# Patient Record
Sex: FEMALE | Race: WHITE | NOT HISPANIC OR LATINO | Employment: OTHER | URBAN - NONMETROPOLITAN AREA
[De-identification: names, ages, dates, MRNs, and addresses within clinical notes are randomized per-mention and may not be internally consistent; named-entity substitution may affect disease eponyms.]

---

## 2023-10-06 ENCOUNTER — TELEPHONE (OUTPATIENT)
Dept: CARDIOLOGY | Facility: CLINIC | Age: 58
End: 2023-10-06
Payer: MEDICARE

## 2023-10-06 NOTE — TELEPHONE ENCOUNTER
Patient is scheduled for surgery 10/26/23. Stress from [facility] in chart. Patient would like note faxed to [phone] when reviewed by Dr. Del Angel when he returns.

## 2023-11-28 ENCOUNTER — APPOINTMENT (OUTPATIENT)
Dept: CARDIOLOGY | Facility: CLINIC | Age: 58
End: 2023-11-28
Payer: MEDICARE

## 2024-01-31 PROBLEM — G35 MULTIPLE SCLEROSIS (MULTI): Status: ACTIVE | Noted: 2024-01-31

## 2024-01-31 PROBLEM — Z91.81 AT RISK FOR FALLS: Status: ACTIVE | Noted: 2024-01-31

## 2024-01-31 PROBLEM — E03.9 HYPOTHYROIDISM: Status: ACTIVE | Noted: 2024-01-31

## 2024-01-31 PROBLEM — R60.9 EDEMA: Status: ACTIVE | Noted: 2024-01-31

## 2024-01-31 PROBLEM — I10 ESSENTIAL HYPERTENSION: Status: ACTIVE | Noted: 2024-01-31

## 2024-01-31 PROBLEM — G47.30 SLEEP APNEA: Status: ACTIVE | Noted: 2024-01-31

## 2024-01-31 RX ORDER — LEVOTHYROXINE SODIUM 125 UG/1
125 TABLET ORAL
COMMUNITY

## 2024-01-31 RX ORDER — CYCLOSPORINE 0.5 MG/ML
1 EMULSION OPHTHALMIC EVERY 12 HOURS
COMMUNITY

## 2024-01-31 RX ORDER — METOLAZONE 5 MG/1
5 TABLET ORAL DAILY
COMMUNITY
Start: 2023-09-26

## 2024-01-31 RX ORDER — ALBUTEROL SULFATE 0.83 MG/ML
SOLUTION RESPIRATORY (INHALATION)
COMMUNITY

## 2024-01-31 RX ORDER — LOSARTAN POTASSIUM 50 MG/1
75 TABLET ORAL DAILY
COMMUNITY

## 2024-01-31 RX ORDER — ZONISAMIDE 50 MG/1
1 CAPSULE ORAL 2 TIMES DAILY
COMMUNITY

## 2024-01-31 RX ORDER — ACETAZOLAMIDE 125 MG/1
125 TABLET ORAL NIGHTLY
COMMUNITY
Start: 2023-08-31

## 2024-01-31 RX ORDER — DULOXETIN HYDROCHLORIDE 20 MG/1
20 CAPSULE, DELAYED RELEASE ORAL 2 TIMES DAILY
COMMUNITY

## 2024-01-31 RX ORDER — METHOCARBAMOL 500 MG/1
500 TABLET, FILM COATED ORAL 4 TIMES DAILY
COMMUNITY
Start: 2023-09-26

## 2024-01-31 RX ORDER — FUROSEMIDE 40 MG/1
40 TABLET ORAL DAILY
COMMUNITY

## 2024-01-31 RX ORDER — ONDANSETRON 4 MG/1
4 TABLET, FILM COATED ORAL AS NEEDED
COMMUNITY

## 2024-01-31 RX ORDER — CLONAZEPAM 1 MG/1
2 TABLET ORAL DAILY
COMMUNITY

## 2024-01-31 RX ORDER — LANOLIN ALCOHOL/MO/W.PET/CERES
1000 CREAM (GRAM) TOPICAL DAILY
COMMUNITY
Start: 2023-08-31

## 2024-01-31 RX ORDER — POTASSIUM CHLORIDE 750 MG/1
10 TABLET, FILM COATED, EXTENDED RELEASE ORAL AS NEEDED
COMMUNITY

## 2024-01-31 RX ORDER — BISMUTH SUBSALICYLATE 262 MG
1 TABLET,CHEWABLE ORAL DAILY
COMMUNITY

## 2024-01-31 RX ORDER — PREGABALIN 75 MG/1
75 CAPSULE ORAL 2 TIMES DAILY
COMMUNITY
Start: 2023-09-26

## 2024-01-31 RX ORDER — ALBUTEROL SULFATE 90 UG/1
2 AEROSOL, METERED RESPIRATORY (INHALATION) EVERY 6 HOURS
COMMUNITY
Start: 2023-06-01

## 2024-04-03 ENCOUNTER — APPOINTMENT (OUTPATIENT)
Dept: CARDIOLOGY | Facility: CLINIC | Age: 59
End: 2024-04-03
Payer: MEDICARE

## 2024-04-18 ENCOUNTER — OFFICE VISIT (OUTPATIENT)
Dept: FAMILY MEDICINE CLINIC | Age: 59
End: 2024-04-18
Payer: MEDICARE

## 2024-04-18 VITALS
OXYGEN SATURATION: 97 % | BODY MASS INDEX: 50.02 KG/M2 | DIASTOLIC BLOOD PRESSURE: 84 MMHG | SYSTOLIC BLOOD PRESSURE: 118 MMHG | WEIGHT: 293 LBS | HEART RATE: 63 BPM | HEIGHT: 64 IN

## 2024-04-18 DIAGNOSIS — E03.9 HYPOTHYROIDISM, UNSPECIFIED TYPE: Primary | ICD-10-CM

## 2024-04-18 DIAGNOSIS — R39.9 UTI SYMPTOMS: ICD-10-CM

## 2024-04-18 DIAGNOSIS — M48.061 SPINAL STENOSIS OF LUMBAR REGION WITHOUT NEUROGENIC CLAUDICATION: ICD-10-CM

## 2024-04-18 DIAGNOSIS — M48.02 CERVICAL STENOSIS OF SPINAL CANAL: ICD-10-CM

## 2024-04-18 PROBLEM — M54.16 LUMBAR RADICULOPATHY: Status: ACTIVE | Noted: 2024-04-18

## 2024-04-18 PROBLEM — M54.12 CERVICAL RADICULOPATHY: Status: ACTIVE | Noted: 2023-07-05

## 2024-04-18 PROBLEM — M16.9 HIP OSTEOARTHRITIS: Status: ACTIVE | Noted: 2023-08-28

## 2024-04-18 PROBLEM — I51.7 CARDIOMEGALY: Status: ACTIVE | Noted: 2023-08-28

## 2024-04-18 PROBLEM — R60.0 BILATERAL LOWER EXTREMITY EDEMA: Status: ACTIVE | Noted: 2023-08-31

## 2024-04-18 LAB
BILIRUBIN, POC: NORMAL
BLOOD URINE, POC: NORMAL
CLARITY, POC: CLEAR
COLOR, POC: YELLOW
GLUCOSE URINE, POC: NORMAL
KETONES, POC: NORMAL
LEUKOCYTE EST, POC: NORMAL
NITRITE, POC: NORMAL
PH, POC: 5.5
PROTEIN, POC: NORMAL
SPECIFIC GRAVITY, POC: 1.02
UROBILINOGEN, POC: NORMAL

## 2024-04-18 PROCEDURE — 1036F TOBACCO NON-USER: CPT | Performed by: NURSE PRACTITIONER

## 2024-04-18 PROCEDURE — G8419 CALC BMI OUT NRM PARAM NOF/U: HCPCS | Performed by: NURSE PRACTITIONER

## 2024-04-18 PROCEDURE — 3017F COLORECTAL CA SCREEN DOC REV: CPT | Performed by: NURSE PRACTITIONER

## 2024-04-18 PROCEDURE — G8427 DOCREV CUR MEDS BY ELIG CLIN: HCPCS | Performed by: NURSE PRACTITIONER

## 2024-04-18 PROCEDURE — 99214 OFFICE O/P EST MOD 30 MIN: CPT | Performed by: NURSE PRACTITIONER

## 2024-04-18 PROCEDURE — 81003 URINALYSIS AUTO W/O SCOPE: CPT | Performed by: NURSE PRACTITIONER

## 2024-04-18 RX ORDER — ALBUTEROL SULFATE 2.5 MG/3ML
2.5 SOLUTION RESPIRATORY (INHALATION) EVERY 4 HOURS PRN
COMMUNITY
Start: 2023-01-25

## 2024-04-18 RX ORDER — PREGABALIN 75 MG/1
75 CAPSULE ORAL 2 TIMES DAILY
COMMUNITY
Start: 2023-09-26

## 2024-04-18 RX ORDER — BROMPHENIRAMINE MALEATE, PSEUDOEPHEDRINE HYDROCHLORIDE, AND DEXTROMETHORPHAN HYDROBROMIDE 2; 30; 10 MG/5ML; MG/5ML; MG/5ML
SYRUP ORAL
COMMUNITY
Start: 2024-03-19

## 2024-04-18 RX ORDER — FUROSEMIDE 40 MG/1
40 TABLET ORAL PRN
COMMUNITY

## 2024-04-18 RX ORDER — IBUPROFEN 200 MG
200 TABLET ORAL EVERY 8 HOURS PRN
COMMUNITY
Start: 2017-09-20

## 2024-04-18 RX ORDER — CYCLOBENZAPRINE HCL 10 MG
10 TABLET ORAL PRN
COMMUNITY

## 2024-04-18 RX ORDER — ZONISAMIDE 100 MG/1
100 CAPSULE ORAL 2 TIMES DAILY
COMMUNITY
Start: 2023-06-25

## 2024-04-18 RX ORDER — LOSARTAN POTASSIUM 50 MG/1
75 TABLET ORAL DAILY
COMMUNITY

## 2024-04-18 RX ORDER — LEVOTHYROXINE SODIUM 0.12 MG/1
125 TABLET ORAL DAILY
COMMUNITY

## 2024-04-18 RX ORDER — ACETAZOLAMIDE 125 MG/1
125 TABLET ORAL NIGHTLY
COMMUNITY
Start: 2023-08-31

## 2024-04-18 RX ORDER — POTASSIUM CHLORIDE 750 MG/1
10 CAPSULE, EXTENDED RELEASE ORAL 2 TIMES DAILY PRN
COMMUNITY
Start: 2023-07-21

## 2024-04-18 RX ORDER — ONDANSETRON 4 MG/1
4 TABLET, ORALLY DISINTEGRATING ORAL EVERY 8 HOURS PRN
COMMUNITY
Start: 2017-09-20

## 2024-04-18 RX ORDER — CYCLOSPORINE 0.5 MG/ML
1 EMULSION OPHTHALMIC EVERY 12 HOURS
COMMUNITY

## 2024-04-18 RX ORDER — ACETAMINOPHEN 500 MG
500 TABLET ORAL
COMMUNITY
Start: 2022-08-25

## 2024-04-18 RX ORDER — METHOCARBAMOL 500 MG/1
500 TABLET, FILM COATED ORAL 4 TIMES DAILY
COMMUNITY

## 2024-04-18 RX ORDER — CLONAZEPAM 1 MG/1
1 TABLET ORAL 3 TIMES DAILY
COMMUNITY

## 2024-04-18 RX ORDER — LANOLIN ALCOHOL/MO/W.PET/CERES
CREAM (GRAM) TOPICAL
COMMUNITY

## 2024-04-18 RX ORDER — HEPARIN SODIUM (PORCINE) LOCK FLUSH IV SOLN 100 UNIT/ML 100 UNIT/ML
500 SOLUTION INTRAVENOUS
COMMUNITY

## 2024-04-18 RX ORDER — ALBUTEROL SULFATE 90 UG/1
AEROSOL, METERED RESPIRATORY (INHALATION)
COMMUNITY

## 2024-04-18 RX ORDER — DULOXETIN HYDROCHLORIDE 30 MG/1
30 CAPSULE, DELAYED RELEASE ORAL 2 TIMES DAILY
COMMUNITY

## 2024-04-18 SDOH — ECONOMIC STABILITY: FOOD INSECURITY: WITHIN THE PAST 12 MONTHS, THE FOOD YOU BOUGHT JUST DIDN'T LAST AND YOU DIDN'T HAVE MONEY TO GET MORE.: NEVER TRUE

## 2024-04-18 SDOH — ECONOMIC STABILITY: FOOD INSECURITY: WITHIN THE PAST 12 MONTHS, YOU WORRIED THAT YOUR FOOD WOULD RUN OUT BEFORE YOU GOT MONEY TO BUY MORE.: NEVER TRUE

## 2024-04-18 SDOH — ECONOMIC STABILITY: HOUSING INSECURITY
IN THE LAST 12 MONTHS, WAS THERE A TIME WHEN YOU DID NOT HAVE A STEADY PLACE TO SLEEP OR SLEPT IN A SHELTER (INCLUDING NOW)?: NO

## 2024-04-18 SDOH — ECONOMIC STABILITY: INCOME INSECURITY: HOW HARD IS IT FOR YOU TO PAY FOR THE VERY BASICS LIKE FOOD, HOUSING, MEDICAL CARE, AND HEATING?: NOT HARD AT ALL

## 2024-04-18 ASSESSMENT — PATIENT HEALTH QUESTIONNAIRE - PHQ9
1. LITTLE INTEREST OR PLEASURE IN DOING THINGS: NOT AT ALL
SUM OF ALL RESPONSES TO PHQ QUESTIONS 1-9: 0
SUM OF ALL RESPONSES TO PHQ QUESTIONS 1-9: 0
SUM OF ALL RESPONSES TO PHQ9 QUESTIONS 1 & 2: 0
SUM OF ALL RESPONSES TO PHQ QUESTIONS 1-9: 0
SUM OF ALL RESPONSES TO PHQ QUESTIONS 1-9: 0

## 2024-04-18 NOTE — PROGRESS NOTES
normal.         Behavior: Behavior normal.       Assessment & Plan    Diagnosis Orders   1. Hypothyroidism, unspecified type  TSH with Reflex   Did have elevated TSH levels during labs in January/2024, scanned in the chart.  Did have medication increased, we will recheck TSH with reflex and make adjustments as needed at that time.   2. Spinal stenosis of lumbar region without neurogenic claudication     Do have some suspicion that urinary symptoms may be caused by recent injections, negative UA in office today we will send for culture to assure.  She is to contact her pain management specialist to discuss her symptoms to see if further evaluation is warranted   3. Cervical stenosis of spinal canal     Stable with no changes, continue following with pain management specialist   4. Body mass index (BMI) 50.0-59.9, adult (HCC)     Continue following with bariatric specialist, continue with encouraged dietary and lifestyle modifications   5. UTI symptoms  POCT Urinalysis No Micro (Auto)   Negative UA in office today, see treatment plan above we will be sending for culture to confirm Culture, Urine        Orders Placed This Encounter   Procedures    Culture, Urine     Standing Status:   Future     Number of Occurrences:   1     Standing Expiration Date:   4/18/2025    TSH with Reflex     Standing Status:   Future     Standing Expiration Date:   4/18/2025    POCT Urinalysis No Micro (Auto)     No orders of the defined types were placed in this encounter.    There are no discontinued medications.  No follow-ups on file.      Reviewed with the patient: current clinical status, medications, activities and diet.     Side effects, adverse effects of the medication prescribed today, as well as treatment plan/ rationale and result expectations have been discussed with the patient who expresses understanding and desires to proceed.    Close follow up to evaluate treatment results and for coordination of care.  I have reviewed the

## 2024-04-19 ENCOUNTER — TELEPHONE (OUTPATIENT)
Dept: FAMILY MEDICINE CLINIC | Age: 59
End: 2024-04-19

## 2024-04-19 DIAGNOSIS — R39.9 UTI SYMPTOMS: ICD-10-CM

## 2024-04-19 NOTE — TELEPHONE ENCOUNTER
Wanted to let Haile know that she was not able to make it to the appt with Dr. Chan to day and will not be able to see him until May 3rd. And she will not be able to do the blood work until next week but give us a call when she gets it done so that we can call them to get the results.

## 2024-04-20 LAB — BACTERIA UR CULT: NORMAL

## 2024-04-24 ASSESSMENT — ENCOUNTER SYMPTOMS
TROUBLE SWALLOWING: 0
VOMITING: 0
EYE PAIN: 0
RHINORRHEA: 0
CHEST TIGHTNESS: 0
COUGH: 0
ABDOMINAL PAIN: 0
SHORTNESS OF BREATH: 0
SINUS PAIN: 0
CONSTIPATION: 0
SORE THROAT: 0
DIARRHEA: 0
EYE REDNESS: 0
BLOOD IN STOOL: 0
PHOTOPHOBIA: 0
SINUS PRESSURE: 0
BACK PAIN: 1
NAUSEA: 0

## 2024-05-02 RX ORDER — LEVOTHYROXINE SODIUM 0.12 MG/1
125 TABLET ORAL DAILY
Qty: 90 TABLET | Refills: 1 | Status: SHIPPED | OUTPATIENT
Start: 2024-05-02 | End: 2024-10-29

## 2024-05-07 ENCOUNTER — TELEPHONE (OUTPATIENT)
Dept: FAMILY MEDICINE CLINIC | Age: 59
End: 2024-05-07

## 2024-07-02 ENCOUNTER — OFFICE VISIT (OUTPATIENT)
Dept: FAMILY MEDICINE CLINIC | Age: 59
End: 2024-07-02
Payer: MEDICARE

## 2024-07-02 VITALS
OXYGEN SATURATION: 98 % | HEART RATE: 76 BPM | SYSTOLIC BLOOD PRESSURE: 126 MMHG | BODY MASS INDEX: 50.02 KG/M2 | TEMPERATURE: 97.7 F | WEIGHT: 293 LBS | DIASTOLIC BLOOD PRESSURE: 74 MMHG | HEIGHT: 64 IN

## 2024-07-02 DIAGNOSIS — E03.9 ACQUIRED HYPOTHYROIDISM: Primary | ICD-10-CM

## 2024-07-02 DIAGNOSIS — M48.061 SPINAL STENOSIS OF LUMBAR REGION WITHOUT NEUROGENIC CLAUDICATION: ICD-10-CM

## 2024-07-02 DIAGNOSIS — M48.02 CERVICAL STENOSIS OF SPINAL CANAL: ICD-10-CM

## 2024-07-02 DIAGNOSIS — G89.4 CHRONIC PAIN DISORDER: ICD-10-CM

## 2024-07-02 PROCEDURE — 3017F COLORECTAL CA SCREEN DOC REV: CPT | Performed by: NURSE PRACTITIONER

## 2024-07-02 PROCEDURE — 99214 OFFICE O/P EST MOD 30 MIN: CPT | Performed by: NURSE PRACTITIONER

## 2024-07-02 PROCEDURE — G8427 DOCREV CUR MEDS BY ELIG CLIN: HCPCS | Performed by: NURSE PRACTITIONER

## 2024-07-02 PROCEDURE — 1036F TOBACCO NON-USER: CPT | Performed by: NURSE PRACTITIONER

## 2024-07-02 PROCEDURE — G8417 CALC BMI ABV UP PARAM F/U: HCPCS | Performed by: NURSE PRACTITIONER

## 2024-07-02 RX ORDER — LEVOTHYROXINE SODIUM 0.12 MG/1
125 TABLET ORAL DAILY
Qty: 90 TABLET | Refills: 1 | Status: SHIPPED | OUTPATIENT
Start: 2024-07-02 | End: 2024-12-29

## 2024-07-02 RX ORDER — SULFAMETHOXAZOLE AND TRIMETHOPRIM 800; 160 MG/1; MG/1
1 TABLET ORAL 2 TIMES DAILY
Qty: 14 TABLET | Refills: 0 | Status: SHIPPED | OUTPATIENT
Start: 2024-07-02 | End: 2024-07-09

## 2024-07-02 NOTE — PROGRESS NOTES
Date of Visit:  2024  Patient Name: Skylar Santos   Patient :  1965     CHIEF COMPLAINT:     Skylar Santos is a 59 y.o. female who presents today for an general visit to be evaluated for the following condition(s):  Chief Complaint   Patient presents with    3 Month Follow-Up    Thyroid Problem     Med refill    Discuss Labs       HISTORY OF PRESENT ILLNESS     I reviewed staff HPI/chief complaint and do agree with above    Continues to have urinary symptoms which include urinary frequency/urgency and stress incontinence.  Recently that it might have been related to injections that she has received in the past and chronic lower back pain however was seen in the ER after our last appointment and did get antibiotics for UTI and symptoms did improve.      She states on Friday Dr. Chan has done third level injections in both sides of her back. RFA on the right side. She states they have not helped at all and she is back to where she was. She is having trouble with the right leg lifting it in the car and now the left is starting to bother her. She states she is getting shocks or pulsating under her bra strap. She states it will be in the neck and stop in the middle and go out to the sides and can go all the way down by the bra strap.     Facial pain on the left as well as right facial redness, cold in her right hand. Headaches are back and 4 a week.  Recently followed with neurologist who put her on a tapering dose of dexamethasone, has had minimal assistance/help with the dexamethasone at best.  She denies any other neurologic symptoms in office today    Does need a refill on her levothyroxine last TSH completed on 2024 was 3.87.  She has continued on her normal levothyroxine dose since this time.  Denies any changes in heat or cold flashes, changes in hair skin or nails, any changes in her baseline bowels.        REVIEW OF SYSTEM      Review of Systems   Constitutional:  Positive for fatigue. Negative

## 2024-07-08 ASSESSMENT — ENCOUNTER SYMPTOMS
EYE PAIN: 0
SORE THROAT: 0
COUGH: 0
DIARRHEA: 0
EYE REDNESS: 0
PHOTOPHOBIA: 0
SINUS PRESSURE: 0
ABDOMINAL PAIN: 0
NAUSEA: 0
RHINORRHEA: 0
CHEST TIGHTNESS: 0
SINUS PAIN: 0
SHORTNESS OF BREATH: 0
TROUBLE SWALLOWING: 0
CONSTIPATION: 0
BLOOD IN STOOL: 0
VOMITING: 0

## 2024-07-15 ENCOUNTER — OFFICE VISIT (OUTPATIENT)
Dept: FAMILY MEDICINE CLINIC | Age: 59
End: 2024-07-15
Payer: MEDICARE

## 2024-07-15 VITALS
BODY MASS INDEX: 50.02 KG/M2 | SYSTOLIC BLOOD PRESSURE: 134 MMHG | OXYGEN SATURATION: 98 % | HEART RATE: 82 BPM | HEIGHT: 64 IN | DIASTOLIC BLOOD PRESSURE: 86 MMHG | WEIGHT: 293 LBS
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
## 2024-07-15 DIAGNOSIS — I95.9 HYPOTENSION, UNSPECIFIED HYPOTENSION TYPE: Primary | ICD-10-CM

## 2024-07-15 DIAGNOSIS — M54.16 LUMBAR RADICULOPATHY: ICD-10-CM

## 2024-07-15 DIAGNOSIS — M54.12 CERVICAL RADICULOPATHY: ICD-10-CM
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
## 2024-07-15 DIAGNOSIS — R60.0 BILATERAL LOWER EXTREMITY EDEMA: ICD-10-CM

## 2024-07-15 PROCEDURE — G8417 CALC BMI ABV UP PARAM F/U: HCPCS | Performed by: NURSE PRACTITIONER

## 2024-07-15 PROCEDURE — 99214 OFFICE O/P EST MOD 30 MIN: CPT | Performed by: NURSE PRACTITIONER

## 2024-07-15 PROCEDURE — 1036F TOBACCO NON-USER: CPT | Performed by: NURSE PRACTITIONER

## 2024-07-15 PROCEDURE — G8427 DOCREV CUR MEDS BY ELIG CLIN: HCPCS | Performed by: NURSE PRACTITIONER

## 2024-07-15 PROCEDURE — 3017F COLORECTAL CA SCREEN DOC REV: CPT | Performed by: NURSE PRACTITIONER

## 2024-07-15 RX ORDER — TAMSULOSIN HYDROCHLORIDE 0.4 MG/1
0.4 CAPSULE ORAL PRN
COMMUNITY

## 2024-07-15 RX ORDER — MONTELUKAST SODIUM 10 MG/1
10 TABLET ORAL NIGHTLY
COMMUNITY

## 2024-07-15 RX ORDER — DEXTROMETHORPHAN HYDROBROMIDE AND PROMETHAZINE HYDROCHLORIDE 15; 6.25 MG/5ML; MG/5ML
SYRUP ORAL
COMMUNITY

## 2024-07-15 RX ORDER — VERAPAMIL HYDROCHLORIDE 120 MG/1
120 CAPSULE, EXTENDED RELEASE ORAL NIGHTLY
COMMUNITY

## 2024-07-15 NOTE — PROGRESS NOTES
Patient Active Problem List   Diagnosis    Acquired hypothyroidism    Hip osteoarthritis    Hip osteoarthritis    Bilateral lower extremity edema    Cardiomegaly    Cervical facet syndrome    Cervical radiculopathy    Cervical stenosis of spinal canal    Lumbar radiculopathy    Lumbar spinal stenosis    Chronic pain disorder       No past medical history on file.    No past surgical history on file.     Social History     Socioeconomic History    Marital status:      Spouse name: None    Number of children: None    Years of education: None    Highest education level: None   Tobacco Use    Smoking status: Never     Passive exposure: Never    Smokeless tobacco: Never     Social Determinants of Health     Financial Resource Strain: Low Risk  (4/18/2024)    Overall Financial Resource Strain (CARDIA)     Difficulty of Paying Living Expenses: Not hard at all   Food Insecurity: No Food Insecurity (4/18/2024)    Hunger Vital Sign     Worried About Running Out of Food in the Last Year: Never true     Ran Out of Food in the Last Year: Never true   Transportation Needs: Unknown (4/18/2024)    PRAPARE - Transportation     Lack of Transportation (Non-Medical): No   Housing Stability: Unknown (4/18/2024)    Housing Stability Vital Sign     Unstable Housing in the Last Year: No        PHYSICAL EXAM     Vitals:    07/15/24 1538   BP: 134/86   Site: Left Upper Arm   Position: Sitting   Cuff Size: Large Adult   Pulse: 82   SpO2: 98%   Weight: (!) 155.5 kg (342 lb 12.8 oz)   Height: 1.626 m (5' 4\")     Physical Exam  Constitutional:       Appearance: Normal appearance. She is normal weight.   HENT:      Mouth/Throat:      Mouth: Mucous membranes are moist.      Pharynx: Oropharynx is clear.   Eyes:      Extraocular Movements: Extraocular movements intact.      Conjunctiva/sclera: Conjunctivae normal.      Pupils: Pupils are equal, round, and reactive to light.   Neck:      Vascular: No carotid bruit.   Cardiovascular:

## 2024-09-30 LAB
NON-UH HIE ALANINE AMINOTRANSFERASE:CCNC:PT:SER/PLAS:QN:NO ADDITION OF P-5': 28 INT._UNIT/L (ref 6–46)
NON-UH HIE ALBUMIN/GLOBULIN:MCRTO:PT:SER:QN:: 1 (ref 1.1–2.2)
NON-UH HIE ALBUMIN:MCNC:PT:SER/PLAS:QN:: 3.5 GM/DL (ref 3.3–5)
NON-UH HIE ALKALINE PHOSPHATASE:CCNC:PT:SER/PLAS:QN:: 77 INT._UNIT/L (ref 21–98)
NON-UH HIE ANION GAP:SCNC:PT:SER/PLAS:QN:: 12 MEQ/L (ref 6–16)
NON-UH HIE ASPARTATE AMINOTRANSFERASE:CCNC:PT:SER/PLAS:QN:: 36 INT._UNIT/L (ref 5–43)
NON-UH HIE BACTERIA:PRTHR:PT:URINE:ORD:AUTOMATED: ABNORMAL /HPF
NON-UH HIE BASOPHILS/LEUKOCYTES:NFR.DF:PT:BLD:QN:AUTOMATED COUNT: 0.1 E9/L (ref 0–0.2)
NON-UH HIE BASOPHILS:NCNC:PT:BLD:QN:AUTOMATED COUNT: 0.9 % (ref 0–2)
NON-UH HIE BILIRUBIN.GLUCURONIDATED+BILIRUBIN.ALBUMIN BOUND:MCNC:PT:SER/PLA: 0.2 MG/DL (ref 0–0.4)
NON-UH HIE BILIRUBIN.NON-GLUCURONIDATED:MSCNC:PT:SER/PLAS:QN:: 0.6 MG/DL (ref 0.1–0.9)
NON-UH HIE BILIRUBIN:MCNC:PT:SER/PLAS:QN:: 0.8 MG/DL (ref 0–1.1)
NON-UH HIE BILIRUBIN:PRTHR:PT:URINE:ORD:TEST STRIP.AUTOMATED: NEGATIVE MG/DL
NON-UH HIE CALCIUM:MCNC:PT:SER/PLAS:QN:: 9.3 MG/DL (ref 8.9–11.1)
NON-UH HIE CARBON DIOXIDE:SCNC:PT:SER/PLAS:QN:: 22 MMOL/L (ref 21–31)
NON-UH HIE CHLORIDE:SCNC:PT:SER/PLAS:QN:: 111 MMOL/L (ref 101–111)
NON-UH HIE CLARITY:TYPE:PT:URINE:NOM:: CLEAR
NON-UH HIE CLASS:TYPE:PT:URINE COLLECTION METHOD:NOM:*: ABNORMAL
NON-UH HIE COLOR:TYPE:PT:URINE:NOM:AUTO: ABNORMAL
NON-UH HIE CREATININE:MCNC:PT:SER/PLAS:QN:: 0.9 MG/DL (ref 0.5–1.3)
NON-UH HIE EGFR: 73 ML/MIN/1.73 M2
NON-UH HIE EOSINOPHILS/100 LEUKOCYTES:NFR:PT:BLD:QN:AUTOMATED COUNT: 1.5 % (ref 0–8)
NON-UH HIE EOSINOPHILS:NCNC:PT:BLD:QN:: 0.1 E9/L (ref 0–0.5)
NON-UH HIE EPITHELIAL CELLS.SQUAMOUS:NARIC:PT:URINE SED:QN:AUTOMATED COUNT: ABNORMAL CD:4673145863
NON-UH HIE ERYTHROCYTE DISTRIBUTION WIDTH:RATIO:PT:RBC:QN:AUTOMATED COUNT: 14.9 % (ref 10.9–14.2)
NON-UH HIE ERYTHROCYTE MEAN CORPUSCULAR HEMOGLOBIN CONCENTRATION:MCNC:PT:RB: 34.7 GM/DL (ref 31.4–36)
NON-UH HIE ERYTHROCYTE MEAN CORPUSCULAR HEMOGLOBIN:ENTMASS:PT:RBC:QN:AUTOMA: 31.4 PG (ref 27–34)
NON-UH HIE ERYTHROCYTE MEAN CORPUSCULAR VOLUME:ENTVOL:PT:RBC:QN:AUTOMATED C: 90.5 FL (ref 80–100)
NON-UH HIE ERYTHROCYTES:NCNC:PT:BLD:QN:AUTOMATED COUNT: 4.6 E12/L (ref 4.3–5.9)
NON-UH HIE ERYTHROCYTES:PRTHR:PT:URINE SED:ORD:MICROSCOPY.LIGHT: >75 CD:4673145863 (ref 0–3)
NON-UH HIE GLOBULIN:MCNC:PT:SER:QN:CALCULATED: 3.5 GM/DL (ref 1.4–4)
NON-UH HIE GLUCOSE:MCNC:PT:SER/PLAS:QN:: 123 MG/DL (ref 55–199)
NON-UH HIE GLUCOSE:PRTHR:PT:URINE:ORD:TEST STRIP: NEGATIVE MG/DL
NON-UH HIE HEMATOCRIT:VFR:PT:BLD:QN:AUTOMATED COUNT: 42 % (ref 34–46)
NON-UH HIE HEMOGLOBIN:MCNC:PT:BLD:QN:: 14.6 GM/DL (ref 12–16)
NON-UH HIE HEMOGLOBIN:MCNC:PT:URINE:SEMIQN:TEST STRIP.AUTOMATED: ABNORMAL MG/DL
NON-UH HIE HYALINE CASTS:PRTHR:PT:URINE SED:ORD:MICROSCOPY.LIGHT: ABNORMAL CD:4673145661 (ref 0–3)
NON-UH HIE KETONES:PRTHR:PT:URINE:ORD:TEST STRIP.AUTOMATED: NEGATIVE MG/DL
NON-UH HIE LEUKOCYTE ESTERASE:PRTHR:PT:URINE:ORD:TEST STRIP.AUTOMATED: ABNORMAL CD:4673125267
NON-UH HIE LEUKOCYTES: 7.8 E9/L (ref 4–11)
NON-UH HIE LEUKOCYTES:NARIC:PT:URINE SED:QN:AUTOMATED COUNT: ABNORMAL CD:4673145863 (ref 0–5)
NON-UH HIE LYMPHOCYTES:NCNC:PT:BLD:QN:: 1.9 E9/L (ref 1–4)
NON-UH HIE LYMPHOCYTES:NCNC:PT:BLD:QN:AUTOMATED COUNT: 24.5 % (ref 14–50)
NON-UH HIE MONOCYTES:NCNC:PT:BLD:QN:AUTOMATED COUNT: 0.9 E9/L (ref 0.2–1)
NON-UH HIE MUCUS:PRTHR:PT:URINE:ORD:AUTOMATED: ABNORMAL CD:4673145661
NON-UH HIE NEUTROPHILS/100 LEUKOCYTES:NFR:PT:BLD:QN:: 62.2 % (ref 36–75)
NON-UH HIE NEUTROPHILS:NCNC:PT:BLD:QN:AUTOMATED COUNT: 4.8 E9/L (ref 2–7.5)
NON-UH HIE NITRITE:PRTHR:PT:URINE:ORD:TEST STRIP.AUTOMATED: NEGATIVE MG/DL
NON-UH HIE PH:LSCNC:PT:URINE:QN:TEST STRIP: 6.5 (ref 5–9)
NON-UH HIE PLATELET MEAN VOLUME:ENTVOL:PT:BLD:QN:AUTOMATED COUNT: 9.9 FL (ref 6.4–10.8)
NON-UH HIE PLATELET: 180 E9/L (ref 150–500)
NON-UH HIE POTASSIUM:SCNC:PT:SER/PLAS:QN:: 3.5 MMOL/L (ref 3.5–5.3)
NON-UH HIE PROTEIN:MCNC:PT:SER/PLAS:QN:: 7 GM/DL (ref 6–7.8)
NON-UH HIE PROTEIN:PRTHR:PT:URINE:ORD:TEST STRIP: ABNORMAL MG/DL
NON-UH HIE SODIUM:SCNC:PT:SER/PLAS:QN:: 141 MMOL/L (ref 135–145)
NON-UH HIE SPECIFIC GRAVITY:RDEN:PT:URINE:QN:TEST STRIP: 1.01 (ref 1–1.03)
NON-UH HIE TRIACYLGLYCEROL LIPASE:CCNC:PT:SER/PLAS:QN:: 35 UNIT/L (ref 13–58)
NON-UH HIE UREA NITROGEN/CREATININE:MRTO:PT:SER/PLAS:QN:: 9 NO UNITS (ref 10–20)
NON-UH HIE UREA NITROGEN:MCNC:PT:SER/PLAS:QN:: 8 MG/DL (ref 5–21)
NON-UH HIE UROBILINOGEN:MCNC:PT:URINE:SEMIQN:TEST STRIP: NEGATIVE MG/DL

## 2024-11-15 ENCOUNTER — OFFICE VISIT (OUTPATIENT)
Dept: FAMILY MEDICINE CLINIC | Age: 59
End: 2024-11-15
Payer: MEDICARE

## 2024-11-15 VITALS
SYSTOLIC BLOOD PRESSURE: 126 MMHG | BODY MASS INDEX: 50.02 KG/M2 | HEART RATE: 54 BPM | OXYGEN SATURATION: 96 % | HEIGHT: 64 IN | WEIGHT: 293 LBS | DIASTOLIC BLOOD PRESSURE: 82 MMHG

## 2024-11-15 DIAGNOSIS — E03.9 ACQUIRED HYPOTHYROIDISM: ICD-10-CM

## 2024-11-15 DIAGNOSIS — R39.9 UTI SYMPTOMS: Primary | ICD-10-CM

## 2024-11-15 DIAGNOSIS — R39.9 UTI SYMPTOMS: ICD-10-CM

## 2024-11-15 LAB — TSH REFLEX: 2.89 UIU/ML (ref 0.44–3.86)

## 2024-11-15 PROCEDURE — 3017F COLORECTAL CA SCREEN DOC REV: CPT | Performed by: NURSE PRACTITIONER

## 2024-11-15 PROCEDURE — G8427 DOCREV CUR MEDS BY ELIG CLIN: HCPCS | Performed by: NURSE PRACTITIONER

## 2024-11-15 PROCEDURE — G8417 CALC BMI ABV UP PARAM F/U: HCPCS | Performed by: NURSE PRACTITIONER

## 2024-11-15 PROCEDURE — 99214 OFFICE O/P EST MOD 30 MIN: CPT | Performed by: NURSE PRACTITIONER

## 2024-11-15 PROCEDURE — 1036F TOBACCO NON-USER: CPT | Performed by: NURSE PRACTITIONER

## 2024-11-15 PROCEDURE — G8484 FLU IMMUNIZE NO ADMIN: HCPCS | Performed by: NURSE PRACTITIONER

## 2024-11-15 RX ORDER — OMEPRAZOLE 40 MG/1
40 CAPSULE, DELAYED RELEASE ORAL DAILY
COMMUNITY
Start: 2024-08-30

## 2024-11-15 RX ORDER — URSODIOL 300 MG/1
300 CAPSULE ORAL 2 TIMES DAILY
COMMUNITY
Start: 2024-08-30

## 2024-11-15 RX ORDER — POLYETHYLENE GLYCOL 3350 17 G/17G
17 POWDER, FOR SOLUTION ORAL DAILY PRN
COMMUNITY
Start: 2024-08-30

## 2024-11-15 RX ORDER — LEVOTHYROXINE SODIUM 125 UG/1
125 TABLET ORAL DAILY
Qty: 90 TABLET | Refills: 1 | Status: SHIPPED | OUTPATIENT
Start: 2024-11-15 | End: 2025-05-14

## 2024-11-15 RX ORDER — TRAMADOL HYDROCHLORIDE 50 MG/1
50 TABLET ORAL NIGHTLY PRN
COMMUNITY
Start: 2024-09-24 | End: 2025-02-13

## 2024-11-15 RX ORDER — OXYCODONE HCL 5 MG/5 ML
5 SOLUTION, ORAL ORAL
COMMUNITY
Start: 2024-08-30

## 2024-11-15 NOTE — PROGRESS NOTES
Date of Visit:  2024  Patient Name: Skylar Santos   Patient :  1965     CHIEF COMPLAINT:     Skylar Santos is a 59 y.o. female who presents today for an general visit to be evaluated for the following condition(s):  Chief Complaint   Patient presents with    Health Maintenance     Labs- due   Mammogram- pt declined   Colonoscopy- completed 2024       Annual Exam     Pt states she thought had a UTI. She states was seen at [facility]. Pt completed antibiotics. Symptoms have improved.        HISTORY OF PRESENT ILLNESS     I reviewed staff HPI/chief complaint and do agree with above    Patient is a 59-year-old female who presents today after being seen and treated for possible UTI at a walk-in clinic.  She has completed the antibiotic and symptoms have improved.    Is due to have her thyroid laboratory work completed with history of hypothyroidism, currently on 125 mcg of Synthroid daily.  Denies any changes in hair skin or nails.  She does have some chronic fatigue type symptoms that have not had any changes from baseline.  She is currently being seen by bariatric surgeon following  Robotic-Assisted Sleeve Gastrectomy, Lysis of adhesions, and excision of soft tissue mass on 2024.  Patient is down approximately 50-60 pounds since having procedure completed.  She is hoping to have continued weight loss and surgery to remove excess tissue in order to have lumbar back surgery.  She does continue following with neurology and pain management.        REVIEW OF SYSTEM      Review of Systems   Constitutional:  Positive for fatigue. Negative for chills and fever.   Respiratory:  Negative for chest tightness and shortness of breath.    Cardiovascular:  Negative for chest pain and palpitations.   Gastrointestinal:  Negative for abdominal pain, constipation, diarrhea, nausea and vomiting.   Genitourinary:  Negative for decreased urine volume, difficulty urinating, dysuria, enuresis, flank pain, frequency, hematuria

## 2024-11-17 LAB — BACTERIA UR CULT: NORMAL

## 2025-01-02 ENCOUNTER — OFFICE VISIT (OUTPATIENT)
Dept: FAMILY MEDICINE CLINIC | Age: 60
End: 2025-01-02

## 2025-01-02 VITALS
OXYGEN SATURATION: 96 % | HEIGHT: 64 IN | HEART RATE: 65 BPM | DIASTOLIC BLOOD PRESSURE: 74 MMHG | SYSTOLIC BLOOD PRESSURE: 116 MMHG | WEIGHT: 293 LBS | BODY MASS INDEX: 50.02 KG/M2

## 2025-01-02 DIAGNOSIS — Z00.00 INITIAL MEDICARE ANNUAL WELLNESS VISIT: Primary | ICD-10-CM

## 2025-01-02 DIAGNOSIS — Z78.0 ENCOUNTER FOR OSTEOPOROSIS SCREENING IN ASYMPTOMATIC POSTMENOPAUSAL PATIENT: ICD-10-CM

## 2025-01-02 DIAGNOSIS — Z13.220 ENCOUNTER FOR LIPID SCREENING FOR CARDIOVASCULAR DISEASE: ICD-10-CM

## 2025-01-02 DIAGNOSIS — Z13.6 SCREENING FOR AAA (ABDOMINAL AORTIC ANEURYSM): ICD-10-CM

## 2025-01-02 DIAGNOSIS — Z13.820 ENCOUNTER FOR OSTEOPOROSIS SCREENING IN ASYMPTOMATIC POSTMENOPAUSAL PATIENT: ICD-10-CM

## 2025-01-02 DIAGNOSIS — Z13.6 ENCOUNTER FOR LIPID SCREENING FOR CARDIOVASCULAR DISEASE: ICD-10-CM

## 2025-01-02 RX ORDER — NYSTATIN TOPICAL POWDER 100000 U/G
POWDER TOPICAL
COMMUNITY
Start: 2024-12-17

## 2025-01-02 RX ORDER — SULFAMETHOXAZOLE AND TRIMETHOPRIM 800; 160 MG/1; MG/1
1 TABLET ORAL EVERY 12 HOURS
COMMUNITY
Start: 2024-12-28

## 2025-01-02 ASSESSMENT — PATIENT HEALTH QUESTIONNAIRE - PHQ9
SUM OF ALL RESPONSES TO PHQ QUESTIONS 1-9: 0
1. LITTLE INTEREST OR PLEASURE IN DOING THINGS: NOT AT ALL
SUM OF ALL RESPONSES TO PHQ9 QUESTIONS 1 & 2: 0
SUM OF ALL RESPONSES TO PHQ QUESTIONS 1-9: 0
2. FEELING DOWN, DEPRESSED OR HOPELESS: NOT AT ALL

## 2025-01-02 ASSESSMENT — LIFESTYLE VARIABLES
HOW MANY STANDARD DRINKS CONTAINING ALCOHOL DO YOU HAVE ON A TYPICAL DAY: PATIENT DOES NOT DRINK
HOW OFTEN DO YOU HAVE A DRINK CONTAINING ALCOHOL: NEVER

## 2025-01-02 NOTE — PROGRESS NOTES
Date of Visit:  2025  Patient Name: Skylar Santos   Patient :  1965     CHIEF COMPLAINT:     Skylar Santos is a 59 y.o. female who presents today for an general visit to be evaluated for the following condition(s):  Chief Complaint   Patient presents with    Medicare AW     Labs- due   Mammogram- declined   Colonoscopy- [facility] 2024       Annual Wellness Visit    Skylar Santos  YOB: 1965    Date ofService:  2025    Patient Active Problem List   Diagnosis    Acquired hypothyroidism    Hip osteoarthritis    Hip osteoarthritis    Bilateral lower extremity edema    Cardiomegaly    Cervical facet syndrome    Cervical radiculopathy    Cervical stenosis of spinal canal    Lumbar radiculopathy    Lumbar spinal stenosis    Chronic pain disorder       Allergies   Allergen Reactions    Iodinated Contrast Media Anaphylaxis and Shortness Of Breath     Other Reaction(s): Anaphylactic Shock, Other, Unknown      Respiratory distress (CT scan dye)      Other Reaction(s): resp. distress    Respiratory distress (CT scan dye)    Cefaclor Hives     Other Reaction(s): Hives, Other, Unknown, Unknown    Hydralazine Hives     Other Reaction(s): hives, Other, welts/flushing    Penicillins Hives     Other Reaction(s): Hives, Other, Unknown    Valproate Sodium Headaches and Nausea And Vomiting    Acetaminophen      Other Reaction(s): Hives    Adhesive Tape     Brivaracetam Other (See Comments)     Other Reaction(s): Agitated, Agitation, Unknown    Other Reaction(s): Agitation, Unknown    Erythromycin Base Hives     Other Reaction(s): Other, Unknown    Hydrocodone-Acetaminophen Other (See Comments)     Other Reaction(s): GI Upset    Other Reaction(s): Unknown, Vomiting    Influenza Virus Vaccine      Other Reaction(s): Edema    Ketorolac Tromethamine      Other Reaction(s): UNRESPONSIVE    Methylprednisolone Headaches    Montelukast      Other Reaction(s): itching, Other    Nalbuphine Hcl     Nsaids

## 2025-04-21 ENCOUNTER — TELEPHONE (OUTPATIENT)
Dept: FAMILY MEDICINE CLINIC | Age: 60
End: 2025-04-21

## 2025-04-21 NOTE — TELEPHONE ENCOUNTER
Pt is calling to see if we got her [facility] results.  She had it done last week and they said that they were going to be sending it over.  Do not see it in the chart.  Got disconnected while trying to get a hold of MA.  Please let pt know when we have results.

## 2025-04-22 ENCOUNTER — TELEPHONE (OUTPATIENT)
Dept: FAMILY MEDICINE CLINIC | Age: 60
End: 2025-04-22

## 2025-04-22 NOTE — TELEPHONE ENCOUNTER
Pt calling for her mammogram results. Please call her back when the results are ready to go over with her.     Ph [phone]

## 2025-04-30 ENCOUNTER — COMMUNITY OUTREACH (OUTPATIENT)
Dept: FAMILY MEDICINE CLINIC | Age: 60
End: 2025-04-30

## 2025-04-30 NOTE — PROGRESS NOTES
Patient's HM shows they are overdue for Colorectal Screening.   Care Everywhere and  files searched.  No results to attach to order nor HM updated.      Faxed request to [facility] records at [phone]. Received 6/24/14 Colonoscopy report and pathology.   updated with reports.

## 2025-05-05 ENCOUNTER — TELEPHONE (OUTPATIENT)
Dept: GASTROENTEROLOGY | Age: 60
End: 2025-05-05

## 2025-05-05 NOTE — TELEPHONE ENCOUNTER
Called [facility] scheduling [facility].Please call [phone] ext [phone] schedule colon screening .

## 2025-06-11 DIAGNOSIS — E03.9 ACQUIRED HYPOTHYROIDISM: ICD-10-CM

## 2025-06-11 RX ORDER — LEVOTHYROXINE SODIUM 125 UG/1
125 TABLET ORAL DAILY
Qty: 90 TABLET | Refills: 1 | Status: SHIPPED | OUTPATIENT
Start: 2025-06-11 | End: 2025-12-08

## 2025-06-11 NOTE — TELEPHONE ENCOUNTER
Comments: pt had recent labs done when she had her [facility] done      Last Office Visit (last PCP visit):   1/2/2025     Next Visit Date:    Future Appointments   Date Time Provider Department Center   7/3/2025  4:30 PM Haile Orourke APRN - CNP [facility]        **If hasn't been seen in over a year OR hasn't followed up according to last diabetes/ADHD visit, make appointment for patient before sending refill to provider.     Rx requested:    Requested Prescriptions     Pending Prescriptions Disp Refills    levothyroxine (SYNTHROID) 125 MCG tablet 90 tablet 1     Sig: Take 1 tablet by mouth Daily

## 2025-06-26 ENCOUNTER — OFFICE VISIT (OUTPATIENT)
Dept: FAMILY MEDICINE CLINIC | Age: 60
End: 2025-06-26

## 2025-06-26 VITALS
TEMPERATURE: 97.6 F | SYSTOLIC BLOOD PRESSURE: 114 MMHG | HEART RATE: 66 BPM | DIASTOLIC BLOOD PRESSURE: 76 MMHG | OXYGEN SATURATION: 97 % | HEIGHT: 64 IN | WEIGHT: 284 LBS | BODY MASS INDEX: 48.49 KG/M2

## 2025-06-26 DIAGNOSIS — M48.061 SPINAL STENOSIS OF LUMBAR REGION WITHOUT NEUROGENIC CLAUDICATION: ICD-10-CM

## 2025-06-26 DIAGNOSIS — M54.9 ACUTE ON CHRONIC BACK PAIN: Primary | ICD-10-CM

## 2025-06-26 DIAGNOSIS — G89.29 ACUTE ON CHRONIC BACK PAIN: Primary | ICD-10-CM

## 2025-06-26 DIAGNOSIS — G89.4 CHRONIC PAIN DISORDER: ICD-10-CM

## 2025-06-26 RX ORDER — MULTIVITAMIN
1 TABLET ORAL DAILY
COMMUNITY

## 2025-06-26 RX ORDER — DULAGLUTIDE 0.75 MG/.5ML
0.75 INJECTION, SOLUTION SUBCUTANEOUS
COMMUNITY
Start: 2025-04-03

## 2025-06-26 RX ORDER — PREDNISONE 20 MG/1
20 TABLET ORAL
COMMUNITY
Start: 2025-06-23

## 2025-06-26 RX ORDER — TRIAMCINOLONE ACETONIDE 40 MG/ML
60 INJECTION, SUSPENSION INTRA-ARTICULAR; INTRAMUSCULAR ONCE
Status: COMPLETED | OUTPATIENT
Start: 2025-06-26 | End: 2025-06-26

## 2025-06-26 RX ORDER — OXYCODONE AND ACETAMINOPHEN 5; 325 MG/1; MG/1
1 TABLET ORAL NIGHTLY PRN
COMMUNITY
Start: 2025-05-15

## 2025-06-26 RX ORDER — LIDOCAINE 50 MG/G
PATCH TOPICAL
COMMUNITY
Start: 2025-06-24

## 2025-06-26 RX ORDER — DIAZEPAM 5 MG/1
5 TABLET ORAL
COMMUNITY

## 2025-06-26 RX ADMIN — TRIAMCINOLONE ACETONIDE 60 MG: 40 INJECTION, SUSPENSION INTRA-ARTICULAR; INTRAMUSCULAR at 16:24

## 2025-06-26 SDOH — ECONOMIC STABILITY: FOOD INSECURITY: WITHIN THE PAST 12 MONTHS, YOU WORRIED THAT YOUR FOOD WOULD RUN OUT BEFORE YOU GOT MONEY TO BUY MORE.: NEVER TRUE

## 2025-06-26 SDOH — ECONOMIC STABILITY: FOOD INSECURITY: WITHIN THE PAST 12 MONTHS, THE FOOD YOU BOUGHT JUST DIDN'T LAST AND YOU DIDN'T HAVE MONEY TO GET MORE.: NEVER TRUE

## 2025-06-26 NOTE — PROGRESS NOTES
Date of Visit:  2025  Patient Name: Skylar Santos   Patient :  1965     CHIEF COMPLAINT:     Skylar Santos is a 60 y.o. female who presents today for an general visit to be evaluated for the following condition(s):  Chief Complaint   Patient presents with    Follow-up     2025-2025 [facility] left-sided lower back pain with radicular symptoms into the left leg. She states had picked up the percocet and steroid but has not started these. She has been using lidocaine still not much relief. Also taking tylenol Pain management appt moved up to August.        HISTORY OF PRESENT ILLNESS     I reviewed staff HPI/chief complaint and do agree with above    Subjective:  - Presents with an acute exacerbation of chronic lumbar back pain, which has significantly worsened following a recent hospital visit. The pain is primarily on the left side, with some involvement of the right back. Reports this is a flare-up of known lumbar stenosis and radiculopathy. The onset was spontaneous. Denies loss of bowel or bladder function. Denies numbness or tingling.    Past Medical History:  - History of chronic lumbar back pain, lumbar stenosis, and radiculopathy.  - Received radiofrequency ablations (RFAs) on the right side in 2023, which provided no relief.  - Previous treatments include a Medrol Dosepak and a tapering course of prednisone.  - Was prescribed oxycodone for five days during the recent hospitalization but is reluctant to take it due to concerns about its effectiveness for nerve pain and potential for dependence. Also received lidocaine patches.  - Currently taking Lyrica 200 mg twice daily. Reports experiencing sleep-related hallucinations at this dosage.  - Past trials of gabapentin, amitriptyline, and duloxetine were ineffective.  - Currently takes cyclobenzaprine (Flexeril) 20 mg, two to three times daily.  - Last MRI of the lumbar spine was in 2023, which showed a fragment of L2 had

## 2025-06-26 NOTE — PROGRESS NOTES
.After obtaining consent, and per orders of Haile Orourke,CNP injection of kenalog given in LT glut by Amaya Louis MA. Patient instructed to remain in clinic for 20 minutes afterwards, and to report any adverse reaction to me immediately. Tolerated well

## 2025-07-09 LAB
NON-UH HIE A/G RATIO: 1.1 (ref 1.1–2.2)
NON-UH HIE AGAP: 13 MEQ/L (ref 6–16)
NON-UH HIE ALBUMIN LVL: 3.7 GM/DL (ref 3.3–5)
NON-UH HIE ALK PHOS: 88 INT._UNIT/L (ref 21–98)
NON-UH HIE ALT: 16 INT._UNIT/L (ref 6–46)
NON-UH HIE AST: 17 INT._UNIT/L (ref 5–43)
NON-UH HIE BASOPHIL ABSOLUTE: 0.1 E9/L (ref 0–0.2)
NON-UH HIE BASOPHIL AUTO: 1.2 % (ref 0–2)
NON-UH HIE BILI DIRECT: 0.1 MG/DL (ref 0–0.4)
NON-UH HIE BILI INDIRECT: 0.5 MG/DL (ref 0.1–0.9)
NON-UH HIE BILI TOTAL: 0.6 MG/DL (ref 0–1.1)
NON-UH HIE BUN/CREAT RATIO: 19 NO UNITS (ref 10–20)
NON-UH HIE BUN: 13 MG/DL (ref 5–21)
NON-UH HIE CALCIUM LVL: 8.8 MG/DL (ref 8.9–11.1)
NON-UH HIE CHLORIDE: 110 MMOL/L (ref 101–111)
NON-UH HIE CO2: 25 MMOL/L (ref 21–31)
NON-UH HIE CREATININE: 0.7 MG/DL (ref 0.5–1.3)
NON-UH HIE EGFR: 98 ML/MIN/1.73 M2
NON-UH HIE EOS ABSOLUTE: 0.2 E9/L (ref 0–0.5)
NON-UH HIE EOS AUTO: 1.7 % (ref 0–8)
NON-UH HIE GLOBULIN: 3.4 GM/DL (ref 1.4–4)
NON-UH HIE GLUCOSE LVL: 105 MG/DL (ref 55–199)
NON-UH HIE HCT: 42.7 % (ref 34–46)
NON-UH HIE HGB: 14.4 GM/DL (ref 12–16)
NON-UH HIE LIPASE LVL: 33 UNIT/L (ref 13–58)
NON-UH HIE LYMPH ABSOLUTE: 2.3 E9/L (ref 1–4)
NON-UH HIE LYMPH AUTO: 25.5 % (ref 14–50)
NON-UH HIE MCH: 30.9 PG (ref 27–34)
NON-UH HIE MCHC: 33.7 GM/DL (ref 31.4–36)
NON-UH HIE MCV: 91.6 FL (ref 80–100)
NON-UH HIE MONO ABSOLUTE: 0.8 E9/L (ref 0.2–1)
NON-UH HIE MONO AUTO: 8.2 % (ref 4–14)
NON-UH HIE MPV: 10 FL (ref 6.4–10.8)
NON-UH HIE NEUTRO ABSOLUTE: 5.9 E9/L (ref 2–7.5)
NON-UH HIE NEUTRO AUTO: 63.4 % (ref 36–75)
NON-UH HIE PLATELET: 221 E9/L (ref 150–500)
NON-UH HIE POTASSIUM LVL: 3.7 MMOL/L (ref 3.5–5.3)
NON-UH HIE RBC: 4.7 E12/L (ref 4.3–5.9)
NON-UH HIE RDW: 13 % (ref 10.9–14.2)
NON-UH HIE SODIUM LVL: 144 MMOL/L (ref 135–145)
NON-UH HIE TOTAL PROTEIN: 7.1 GM/DL (ref 6–7.8)
NON-UH HIE WBC: 9.2 E9/L (ref 4–11)

## 2025-08-04 ENCOUNTER — OFFICE VISIT (OUTPATIENT)
Dept: FAMILY MEDICINE CLINIC | Age: 60
End: 2025-08-04
Payer: MEDICARE

## 2025-08-04 VITALS
SYSTOLIC BLOOD PRESSURE: 120 MMHG | HEIGHT: 64 IN | HEART RATE: 56 BPM | DIASTOLIC BLOOD PRESSURE: 76 MMHG | OXYGEN SATURATION: 100 % | BODY MASS INDEX: 47.73 KG/M2 | WEIGHT: 279.6 LBS | TEMPERATURE: 98 F

## 2025-08-04 DIAGNOSIS — E16.2 LOW BLOOD SUGAR READING: ICD-10-CM

## 2025-08-04 DIAGNOSIS — M54.16 LUMBAR RADICULOPATHY: ICD-10-CM

## 2025-08-04 DIAGNOSIS — M48.061 SPINAL STENOSIS OF LUMBAR REGION WITHOUT NEUROGENIC CLAUDICATION: Primary | ICD-10-CM

## 2025-08-04 LAB
CHP ED QC CHECK: NORMAL
GLUCOSE BLD-MCNC: 96 MG/DL

## 2025-08-04 PROCEDURE — G8417 CALC BMI ABV UP PARAM F/U: HCPCS | Performed by: NURSE PRACTITIONER

## 2025-08-04 PROCEDURE — 3017F COLORECTAL CA SCREEN DOC REV: CPT | Performed by: NURSE PRACTITIONER

## 2025-08-04 PROCEDURE — 82962 GLUCOSE BLOOD TEST: CPT | Performed by: NURSE PRACTITIONER

## 2025-08-04 PROCEDURE — G8427 DOCREV CUR MEDS BY ELIG CLIN: HCPCS | Performed by: NURSE PRACTITIONER

## 2025-08-04 PROCEDURE — 99214 OFFICE O/P EST MOD 30 MIN: CPT | Performed by: NURSE PRACTITIONER

## 2025-08-04 PROCEDURE — 1036F TOBACCO NON-USER: CPT | Performed by: NURSE PRACTITIONER

## 2025-08-04 PROCEDURE — 96372 THER/PROPH/DIAG INJ SC/IM: CPT | Performed by: NURSE PRACTITIONER

## 2025-08-04 RX ORDER — TRIAMCINOLONE ACETONIDE 40 MG/ML
60 INJECTION, SUSPENSION INTRA-ARTICULAR; INTRAMUSCULAR ONCE
Status: COMPLETED | OUTPATIENT
Start: 2025-08-04 | End: 2025-08-04

## 2025-08-04 RX ADMIN — TRIAMCINOLONE ACETONIDE 60 MG: 40 INJECTION, SUSPENSION INTRA-ARTICULAR; INTRAMUSCULAR at 09:58

## 2025-08-06 ASSESSMENT — ENCOUNTER SYMPTOMS
VOMITING: 0
CHEST TIGHTNESS: 0
DIARRHEA: 0
NAUSEA: 0
ABDOMINAL PAIN: 0
COLOR CHANGE: 0
BACK PAIN: 1
SHORTNESS OF BREATH: 0
CONSTIPATION: 0

## 2025-08-19 ENCOUNTER — TELEPHONE (OUTPATIENT)
Dept: GASTROENTEROLOGY | Age: 60
End: 2025-08-19